# Patient Record
Sex: FEMALE | Race: WHITE | NOT HISPANIC OR LATINO | Employment: FULL TIME | URBAN - METROPOLITAN AREA
[De-identification: names, ages, dates, MRNs, and addresses within clinical notes are randomized per-mention and may not be internally consistent; named-entity substitution may affect disease eponyms.]

---

## 2021-12-06 ENCOUNTER — PATIENT MESSAGE (OUTPATIENT)
Dept: RESEARCH | Facility: HOSPITAL | Age: 61
End: 2021-12-06

## 2022-12-06 PROBLEM — M22.2X9 PATELLOFEMORAL STRESS SYNDROME: Status: ACTIVE | Noted: 2019-09-10

## 2022-12-06 PROBLEM — M25.562 PAIN, JOINT, KNEE, LEFT: Status: ACTIVE | Noted: 2019-09-10

## 2022-12-06 PROBLEM — M77.00 MEDIAL EPICONDYLITIS: Status: ACTIVE | Noted: 2021-01-14

## 2022-12-06 PROBLEM — M25.529 PAIN IN ELBOW: Status: ACTIVE | Noted: 2021-01-14

## 2024-04-11 PROBLEM — R87.619 ABNORMAL PAP SMEAR OF CERVIX: Status: ACTIVE | Noted: 2022-01-01

## 2024-04-11 PROBLEM — B00.9 HERPES SIMPLEX VIRUS (HSV) INFECTION: Status: ACTIVE | Noted: 2024-04-11

## 2025-04-11 PROBLEM — G47.00 INSOMNIA: Status: ACTIVE | Noted: 2025-04-11

## 2025-04-11 PROBLEM — M47.816 SPONDYLOSIS OF LUMBAR REGION WITHOUT MYELOPATHY OR RADICULOPATHY: Status: ACTIVE | Noted: 2025-04-11

## 2025-05-02 ENCOUNTER — PATIENT MESSAGE (OUTPATIENT)
Dept: URGENT CARE | Facility: CLINIC | Age: 65
End: 2025-05-02

## 2025-05-02 ENCOUNTER — NURSE TRIAGE (OUTPATIENT)
Dept: ADMINISTRATIVE | Facility: CLINIC | Age: 65
End: 2025-05-02

## 2025-05-02 NOTE — TELEPHONE ENCOUNTER
"Pt states she is having UTI symptoms this morning. She is having trouble urinate. It stings when she urinates. She just feels "yuck." Feels like a trickle when she goes but it is more than a few drops and her bladder doesn't feel full. Care advice recommends pt be seen today. Pt stated she wanted to do a virtual visit. Pt advised on how to schedule an on demand virtual appointment.  Reason for Disposition   Age > 50 years    Additional Information   Negative: Shock suspected (e.g., cold/pale/clammy skin, too weak to stand, low BP, rapid pulse)   Negative: Sounds like a life-threatening emergency to the triager   Negative: Unable to urinate (or only a few drops) and bladder feels very full   Negative: Vomiting   Negative: Patient sounds very sick or weak to the triager   Negative: Severe pain with urination   Negative: Fever > 100.4 F (38.0 C)   Negative: Side (flank) or lower back pain present   Negative: Unusual vaginal discharge   Negative: > 2 UTIs in last year   Negative: Patient is worried they have a sexually transmitted infection (STI)    Protocols used: Urination Pain - Female-A-[place]    "